# Patient Record
(demographics unavailable — no encounter records)

---

## 2024-12-12 NOTE — PLAN
[FreeTextEntry1] : Pap done, already has mammo scheduled. Discussed ca and exercise. Mild atrophy see, advised trial of coconut oil but if no improvement to CBO.  Patient screened for depression - no signs of clinical depression. PHQ-9 scores reviewed over the course of the visit 5-10minutes of face to face time. Follow up with changes in mood including other symptoms of anxiety.

## 2024-12-12 NOTE — HISTORY OF PRESENT ILLNESS
[FreeTextEntry1] :  2 years ago 55yo P1 here for annual. Prev iwth hot flashes but now improving. Reports hx of dense breasts and had R breast biopsy that was normal in 2010.  Hx of hyperthyroid on methimazole and thyroid nodules, sees endo. Also hx of osteopenia on raloxifene, takes Ca and reports vit D levels wnl. Occ has vaginal itching.  HCM - pap 8/2023 regular  denies abnl paps - mammo 1/2024 normal - colonoscopy at age 50 repeat 5 years - dexa 2023 osteopenia   POB: CSx1 (26yo daughter in [place])

## 2025-02-13 NOTE — REASON FOR VISIT
[Medical Office: ([facility])___] : at the medical office located in  [Telehealth (audio & video)] : This visit was provided via telehealth using real-time 2-way audio visual technology. [Verbal consent obtained from patient] : the patient, [unfilled] [Follow - Up] : a follow-up visit [Hyperthyroidism] : hyperthyroidism [Thyroid nodule/ MNG] : thyroid nodule/ MNG [Home] : at home, [unfilled] , at the time of the visit. [Other Location: e.g. Home (Enter Location, City,State)___] : at [unfilled] [Patient] : the patient [Self] : self

## 2025-02-13 NOTE — PHYSICAL EXAM
[TextEntry] : PHYSICAL EXAMINATION: Limited by video visit. GENERAL: No acute distress, normal appearance EYES: no icterus, no proptosis NECK: No visible thyroid enlargement RESPIRATORY: normal speech, no acute respiratory distress PSYCHIATRIC: mood and affect are normal

## 2025-02-13 NOTE — REASON FOR VISIT
[Medical Office: ([facility])___] : at the medical office located in  [Telehealth (audio & video)] : This visit was provided via telehealth using real-time 2-way audio visual technology. [Verbal consent obtained from patient] : the patient, [unfilled] [Follow - Up] : a follow-up visit [Hyperthyroidism] : hyperthyroidism [Thyroid nodule/ MNG] : thyroid nodule/ MNG [Home] : at home, [unfilled] , at the time of the visit. [Other Location: e.g. Home (Enter Location, City,State)___] : at [unfilled] [Patient] : the patient [Self] : self

## 2025-02-13 NOTE — HISTORY OF PRESENT ILLNESS
LM on voicemail reminding pt not to take Eloquis per his request from earlier phone encounter.   
[FreeTextEntry1] : CHIEF COMPLAINT: hyperthyroidism, thyroid nodules REFERRED BY: Dr. Josue Candelaria   HISTORY OF PRESENTING ILLNESS: The patient is a 54year old female being seen in the office today for evaluation of hyperthyroidism and thyroid nodules.   #Hyperthyroidism Patient was initially diagnosed with hyperthyroidism in 2012. She took methimazole initially for 2-3 years and then went into remission. In 2017 she needed to restart methimazole. She has been taking methimazole 5mg daily since 2017 with stable TFTs.  +ve TSI and TRAb GUTIERREZ uptake/scan 9/2024 with diffuse increased uptake 69% consistent with Graves'. RLP functioning nodule that does not suppress the remainder of the gland.   2/13/2025 symptoms: No palpitations or tremors, no constipation or diarrhea, no hair or skin changes.  Endorses weight gain of around 4 pounds.  Endorses heat intolerance. Compressive symptoms: No neck swelling, no dysphagia, no dyspnea, no change in voice.  She takes a multivitamin that contains biotin.  Labs from 9/2024: TSH 0.1, free T4 1.4, total T3 108, normal CBC/CMP  #Thyroid nodules Patient has been aware of thyroid nodules for past 3-4 years, which were originally detected incidentally on US.  Thyroid US 5/2024:  -RMP 2.1 cm thyroid cyst with debris, -RLP 2.5 cm isoechoic solid thyroid nodule, slightly decreased from prior. -LMP 0.8 cm complex thyroid nodule -LMP posterior 0.6 cm complex thyroid nodule.   Patient is s/p FNA biopsies of a reported right lower 2.3 cm solid thyroid nodule and a right lower isthmus 1.5 cm solid thyroid nodule, performed on 05/21/2021 (Maria De Jesus). Both were bethesda 2/benign.    Patient has an aunt with hyperthyroidism but no family history of thyroid cancer. No personal history of radiation exposure in the head and neck area.  #Osteopenia Patient reportedly had a DEXA scan showing osteopenia 2 years ago, though unable to access the report. She has been taking raloxifene 60mg daily since around 2022. No side effects. Patient takes OTC calcium supplements. Patient denies any falls, fractures, difficulty ambulating, loss of height.   
[FreeTextEntry1] : CHIEF COMPLAINT: hyperthyroidism, thyroid nodules REFERRED BY: Dr. Josue Candelaria   HISTORY OF PRESENTING ILLNESS: The patient is a 54year old female being seen in the office today for evaluation of hyperthyroidism and thyroid nodules.   #Hyperthyroidism Patient was initially diagnosed with hyperthyroidism in 2012. She took methimazole initially for 2-3 years and then went into remission. In 2017 she needed to restart methimazole. She has been taking methimazole 5mg daily since 2017 with stable TFTs.  +ve TSI and TRAb GUTIERREZ uptake/scan 9/2024 with diffuse increased uptake 69% consistent with Graves'. RLP functioning nodule that does not suppress the remainder of the gland.   2/13/2025 symptoms: No palpitations or tremors, no constipation or diarrhea, no hair or skin changes.  Endorses weight gain of around 4 pounds.  Endorses heat intolerance. Compressive symptoms: No neck swelling, no dysphagia, no dyspnea, no change in voice.  She takes a multivitamin that contains biotin.  Labs from 9/2024: TSH 0.1, free T4 1.4, total T3 108, normal CBC/CMP  #Thyroid nodules Patient has been aware of thyroid nodules for past 3-4 years, which were originally detected incidentally on US.  Thyroid US 5/2024:  -RMP 2.1 cm thyroid cyst with debris, -RLP 2.5 cm isoechoic solid thyroid nodule, slightly decreased from prior. -LMP 0.8 cm complex thyroid nodule -LMP posterior 0.6 cm complex thyroid nodule.   Patient is s/p FNA biopsies of a reported right lower 2.3 cm solid thyroid nodule and a right lower isthmus 1.5 cm solid thyroid nodule, performed on 05/21/2021 (Maria De Jesus). Both were bethesda 2/benign.    Patient has an aunt with hyperthyroidism but no family history of thyroid cancer. No personal history of radiation exposure in the head and neck area.  #Osteopenia Patient reportedly had a DEXA scan showing osteopenia 2 years ago, though unable to access the report. She has been taking raloxifene 60mg daily since around 2022. No side effects. Patient takes OTC calcium supplements. Patient denies any falls, fractures, difficulty ambulating, loss of height.

## 2025-02-13 NOTE — ASSESSMENT
[FreeTextEntry1] : 1. Hyperthyroidism secondary to Graves disease (+hyperfunctioning nodule) - Patient was initially diagnosed with hyperthyroidism in 2012. Positive antibodies suggest Graves disease, GUTIERREZ scan 9/2024 consistent with Graves + RLP functioning nodule.  - Taking methimazole 5 mg daily since 2017 with stable TFTs. - Clinically and biochemically euthyroid PLAN - Continue methimazole 5mg daily - Definitive treatment options discussed including GUTIERREZ and surgery. Discussed risks/benefits of GUTIERREZ, she will likely develop post ablative hypothyroidism if treated with GUTIERREZ. Discussed that low dose methimazole is usually safe and she has normal CBC/CMP in 9/2024. For now recommend continuing methimazole rather than definitive therapy with GUTIERREZ. Pt in agreement.  - Repeat TFTs every 6 months while on stable dose of methimazole.  Due for labs today, orders placed. - We discussed risks and side effects of taking methimazole, including but not limited to rash, agranulocytosis, liver dysfunction/failure. Advised that any fever/sore throat, nausea/vomiting/abdominal/pain/jaundice should prompt holding the medication and getting a lab draw for CBC and LFTs.  - Beta blocker: HR controlled, no palpitations  - No evidence of ROMAN.  2. Multiple thyroid nodules Thyroid US 5/2024: -RMP 2.1 cm thyroid cyst with debris, -RLP 2.5 cm isoechoic solid thyroid nodule, slightly decreased from prior. -LMP 0.8 cm complex thyroid nodule -LMP posterior 0.6 cm complex thyroid nodule. - Patient is s/p FNA biopsies of a reported right lower 2.3 cm solid thyroid nodule and a right lower isthmus 1.5 cm solid thyroid nodule, performed on 05/21/2021 (Maria De Jesus). Both were [place] 2 PLAN - No indication for repeat FNA at this point given stable benign nodules  - Repeat thyroid US in 1 year around 5/2025, ordered today  3. Osteopenia - Patient asked to send us the DEXA report.  Repeat DEXA scan every 2 years. - Continue raloxifene 60mg daily, managed by PCP - Normal 25OH vitamin D in 9/2024  RTC in 6 months with previsit thyroid ultrasound.  Yolette Ontiveros MD [facility] at [address], [address] Ph: [phone] Fax: [phone]

## 2025-02-13 NOTE — ASSESSMENT
[FreeTextEntry1] : 1. Hyperthyroidism secondary to Graves disease (+hyperfunctioning nodule) - Patient was initially diagnosed with hyperthyroidism in 2012. Positive antibodies suggest Graves disease, GUTIERREZ scan 9/2024 consistent with Graves + RLP functioning nodule.  - Taking methimazole 5 mg daily since 2017 with stable TFTs. - Clinically and biochemically euthyroid PLAN - Continue methimazole 5mg daily - Definitive treatment options discussed including GUTIERREZ and surgery. Discussed risks/benefits of GUTIERREZ, she will likely develop post ablative hypothyroidism if treated with GUTIERREZ. Discussed that low dose methimazole is usually safe and she has normal CBC/CMP in 9/2024. For now recommend continuing methimazole rather than definitive therapy with GUTIERREZ. Pt in agreement.  - Repeat TFTs every 6 months while on stable dose of methimazole.  Due for labs today, orders placed. - We discussed risks and side effects of taking methimazole, including but not limited to rash, agranulocytosis, liver dysfunction/failure. Advised that any fever/sore throat, nausea/vomiting/abdominal/pain/jaundice should prompt holding the medication and getting a lab draw for CBC and LFTs.  - Beta blocker: HR controlled, no palpitations  - No evidence of ROMAN.  2. Multiple thyroid nodules Thyroid US 5/2024: -RMP 2.1 cm thyroid cyst with debris, -RLP 2.5 cm isoechoic solid thyroid nodule, slightly decreased from prior. -LMP 0.8 cm complex thyroid nodule -LMP posterior 0.6 cm complex thyroid nodule. - Patient is s/p FNA biopsies of a reported right lower 2.3 cm solid thyroid nodule and a right lower isthmus 1.5 cm solid thyroid nodule, performed on 05/21/2021 (Maria De Jesus). Both were [place] 2 PLAN - No indication for repeat FNA at this point given stable benign nodules  - Repeat thyroid US in 1 year around 5/2025, ordered today  3. Osteopenia - Patient asked to send us the DEXA report.  Repeat DEXA scan every 2 years. - Continue raloxifene 60mg daily, managed by PCP - Normal 25OH vitamin D in 9/2024  RTC in 6 months with previsit thyroid ultrasound.  Yolette Ontiveros MD [facility] at [address], [address] Ph: [phone] Fax: [phone]